# Patient Record
Sex: FEMALE | Race: BLACK OR AFRICAN AMERICAN | NOT HISPANIC OR LATINO
[De-identification: names, ages, dates, MRNs, and addresses within clinical notes are randomized per-mention and may not be internally consistent; named-entity substitution may affect disease eponyms.]

---

## 2021-11-19 ENCOUNTER — DASHBOARD ENCOUNTERS (OUTPATIENT)
Age: 61
End: 2021-11-19

## 2021-11-19 ENCOUNTER — LAB OUTSIDE AN ENCOUNTER (OUTPATIENT)
Dept: URBAN - METROPOLITAN AREA CLINIC 109 | Facility: CLINIC | Age: 61
End: 2021-11-19

## 2021-11-19 ENCOUNTER — OFFICE VISIT (OUTPATIENT)
Dept: URBAN - METROPOLITAN AREA CLINIC 109 | Facility: CLINIC | Age: 61
End: 2021-11-19
Payer: COMMERCIAL

## 2021-11-19 VITALS
WEIGHT: 193.6 LBS | TEMPERATURE: 97.5 F | HEIGHT: 66 IN | SYSTOLIC BLOOD PRESSURE: 145 MMHG | BODY MASS INDEX: 31.12 KG/M2 | DIASTOLIC BLOOD PRESSURE: 85 MMHG | HEART RATE: 82 BPM

## 2021-11-19 DIAGNOSIS — K31.A13 INTESTINAL METAPLASIA OF FUNDUS OF STOMACH WITHOUT DYSPLASIA: ICD-10-CM

## 2021-11-19 DIAGNOSIS — K64.8 BLEEDING INTERNAL HEMORRHOIDS: ICD-10-CM

## 2021-11-19 DIAGNOSIS — D51.0 PERNICIOUS ANEMIA: ICD-10-CM

## 2021-11-19 PROBLEM — 84027009: Status: ACTIVE | Noted: 2021-11-19

## 2021-11-19 PROCEDURE — 99204 OFFICE O/P NEW MOD 45 MIN: CPT | Performed by: INTERNAL MEDICINE

## 2021-11-19 RX ORDER — SOY PROTEIN
POWDER (GRAM) ORAL
Qty: 0 | Refills: 0 | COMMUNITY
Start: 1900-01-01

## 2021-11-19 NOTE — HPI-TODAY'S VISIT:
referred for rectal bleeding, associated with hemorrhoids known pernicious anemia  I reviewed her chart EGD 2007 with extensive gastric intestinal metaplasia of the prox stomach and poss evidence of pernicious anemia I don't have recent labs Colon 2015 and 2018 were both normal  she reports anemic and gets B12 shots monthly

## 2022-01-06 ENCOUNTER — OFFICE VISIT (OUTPATIENT)
Dept: URBAN - METROPOLITAN AREA SURGERY CENTER 23 | Facility: SURGERY CENTER | Age: 62
End: 2022-01-06
Payer: COMMERCIAL

## 2022-01-06 DIAGNOSIS — K29.60 ADENOPAPILLOMATOSIS GASTRICA: ICD-10-CM

## 2022-01-06 DIAGNOSIS — K31.7 BENIGN GASTRIC POLYP: ICD-10-CM

## 2022-01-06 DIAGNOSIS — K22.89 OTHER SPECIFIED DISEASE OF ESOPHAGUS: ICD-10-CM

## 2022-01-06 DIAGNOSIS — K31.A12 INTESTINAL METAPLASIA OF BODY OF STOMACH WITHOUT DYSPLASIA: ICD-10-CM

## 2022-01-06 DIAGNOSIS — K31.A13 INTESTINAL METAPLASIA OF FUNDUS OF STOMACH WITHOUT DYSPLASIA: ICD-10-CM

## 2022-01-06 PROCEDURE — G8907 PT DOC NO EVENTS ON DISCHARG: HCPCS | Performed by: INTERNAL MEDICINE

## 2022-01-06 PROCEDURE — 43251 EGD REMOVE LESION SNARE: CPT | Performed by: INTERNAL MEDICINE

## 2022-01-06 PROCEDURE — 43239 EGD BIOPSY SINGLE/MULTIPLE: CPT | Performed by: INTERNAL MEDICINE

## 2024-11-15 ENCOUNTER — OFFICE VISIT (OUTPATIENT)
Dept: URBAN - METROPOLITAN AREA CLINIC 109 | Facility: CLINIC | Age: 64
End: 2024-11-15
Payer: COMMERCIAL

## 2024-11-15 VITALS
BODY MASS INDEX: 32.17 KG/M2 | HEIGHT: 66 IN | TEMPERATURE: 98.1 F | WEIGHT: 200.2 LBS | DIASTOLIC BLOOD PRESSURE: 84 MMHG | HEART RATE: 73 BPM | SYSTOLIC BLOOD PRESSURE: 155 MMHG

## 2024-11-15 DIAGNOSIS — K64.8 BLEEDING INTERNAL HEMORRHOIDS: ICD-10-CM

## 2024-11-15 DIAGNOSIS — D51.0 PERNICIOUS ANEMIA: ICD-10-CM

## 2024-11-15 DIAGNOSIS — K31.A13 INTESTINAL METAPLASIA OF FUNDUS OF STOMACH WITHOUT DYSPLASIA: ICD-10-CM

## 2024-11-15 PROCEDURE — 46221 LIGATION OF HEMORRHOID(S): CPT | Performed by: INTERNAL MEDICINE

## 2024-11-15 PROCEDURE — 99214 OFFICE O/P EST MOD 30 MIN: CPT | Performed by: INTERNAL MEDICINE

## 2024-11-15 RX ORDER — SOY PROTEIN
POWDER (GRAM) ORAL
Qty: 0 | Refills: 0 | Status: ON HOLD | COMMUNITY
Start: 1900-01-01

## 2024-11-15 NOTE — HPI-TODAY'S VISIT:
11/15/24 seeing blood spotting again   but has been seeing intermittantly the last few years came in for same thing 3 years ago, up to date on colon so I recommended anoscopy and poss hemorrhoid banding she declined was lost to followup after the EGD 2022 (below)  had EGD 1/2022 Impression: - Normal second portion of the duodenum. - A single duodenal polyp. Resected and retrieved. - Gastritis. Biopsied. - Gastritis. Biopsied. - Gastritis. Biopsied. - Z-line irregular, 40 cm from the incisors. - The examination was otherwise normal. Recommendation: - Patient has a contact number available for emergencies. The signs and symptoms of potential delayed complications were discussed with the patient. Return to normal activities tomorrow. Written discharge instructions were provided to the patient. - Await pathology results. - Repeat upper endoscopy in 3 years for surveillance.   A Duodenum bulb, polypectomy Hyperplastic foveolar type mucosa, inflamed, compatible with inflammatory polyp. An immunostain for synaptophysin is negative in the lamina propria. B Stomach antrum, biopsy slight Chronic gastritis; Helicobacter-like organisms not identified on Giemsa stain. C Stomach body, biopsy slight Chronic gastritis with intestinal metaplasia and focal neuroendocrine dysplasia; Helicobacter-like organisms not identified on Giemsa stain. See comment. COMMENT: Examined sections show a microscopic well-differentiated neuroendocrine proliferation which measures 0.34 mm in greatest linear expanse on this sampling, meeting the size criteria for classification as neuroendocrine dysplasia. The neuroendocrine cells are positive for synaptophysin and chromogranin. The Ki-67 proliferation index is approximately less than 1%. The findings of intestinal metaplasia as well as neuroendocrine proliferations can be seen in association with/the setting of autoimmune atrophic gastritis. Clinical and serological correlation is recommended. Seen in intradepartmental review. D Gastric fundus, biopsy slight Chronic gastritis, active, with intestinal metaplasia and focal neuroendocrine hyperplasia; Helicobacter-like organisms not identified on Giemsa stain. See comment. COMMENT: An immunostain for synaptophysin demonstrates a minute neuroendocrine proliferation meeting the size criteria for classification as neuroendocrine hyperplasia (approximately 0.15 mm). The findings of intestinal metaplasia as well as neuroendocrine proliferations can be seen in association with/the setting of autoimmune atrophic gastritis. Clinical and serological correlation is recommended. Usha Ramirez MD ***Electronically Signed Out***    11/2021 referred for rectal bleeding, associated with hemorrhoids known pernicious anemia  I reviewed her chart EGD 2007 with extensive gastric intestinal metaplasia of the prox stomach and poss evidence of pernicious anemia I don't have recent labs Colon 2015 and 2018 were both normal  she reports anemic and gets B12 shots monthly

## 2024-11-15 NOTE — EXAM-PHYSICAL EXAM
aKtie  in room as chaperone Rectal exam: normal external exam  Anoscopy performed using self lighted anoscope Grade 1 RA, Grade 2 RP, Grade 2 LL  Banded RP, RA, and LL using [employer] [employer] system without complication after reviewing R/B/A with the patient

## 2024-12-03 ENCOUNTER — CLAIMS CREATED FROM THE CLAIM WINDOW (OUTPATIENT)
Dept: URBAN - METROPOLITAN AREA SURGERY CENTER 23 | Facility: SURGERY CENTER | Age: 64
End: 2024-12-03
Payer: COMMERCIAL

## 2024-12-03 DIAGNOSIS — K31.89 OTHER DISEASES OF STOMACH AND DUODENUM: ICD-10-CM

## 2024-12-03 DIAGNOSIS — K31.A15 GASTRIC INTESTINAL METAPLASIA WITHOUT DYSPLASIA, INVOLVING MULTIPLE SITES: ICD-10-CM

## 2024-12-03 DIAGNOSIS — K31.A15 INTESTINAL METAPLASIA OF STOMACH INVOLVING MULTIPLE SITES WITHOUT DYSPLASIA: ICD-10-CM

## 2024-12-03 DIAGNOSIS — K29.60 OTHER GASTRITIS WITHOUT BLEEDING: ICD-10-CM

## 2024-12-03 DIAGNOSIS — K22.89 OTHER SPECIFIED DISEASE OF ESOPHAGUS: ICD-10-CM

## 2024-12-03 PROCEDURE — 00731 ANES UPR GI NDSC PX NOS: CPT | Performed by: NURSE ANESTHETIST, CERTIFIED REGISTERED

## 2024-12-03 PROCEDURE — 43239 EGD BIOPSY SINGLE/MULTIPLE: CPT | Performed by: INTERNAL MEDICINE

## 2024-12-03 RX ORDER — SOY PROTEIN
POWDER (GRAM) ORAL
Qty: 0 | Refills: 0 | Status: ON HOLD | COMMUNITY
Start: 1900-01-01